# Patient Record
Sex: MALE | URBAN - METROPOLITAN AREA
[De-identification: names, ages, dates, MRNs, and addresses within clinical notes are randomized per-mention and may not be internally consistent; named-entity substitution may affect disease eponyms.]

---

## 2023-06-19 ENCOUNTER — OFFICE VISIT (OUTPATIENT)
Facility: LOCATION | Age: 48
End: 2023-06-19
Payer: COMMERCIAL

## 2023-06-19 DIAGNOSIS — H04.123 DRY EYE SYNDROME OF BILATERAL LACRIMAL GLANDS: ICD-10-CM

## 2023-06-19 DIAGNOSIS — H18.623 KERATOCONUS, UNSTABLE, BILATERAL: Primary | ICD-10-CM

## 2023-06-19 DIAGNOSIS — H16.223 KERATOCONJUNCT SICCA, NOT SPECIFIED AS SJOGREN'S, BILATERAL: ICD-10-CM

## 2023-06-19 DIAGNOSIS — H18.713 CORNEAL ECTASIA, BILATERAL: ICD-10-CM

## 2023-06-19 PROCEDURE — 99204 OFFICE O/P NEW MOD 45 MIN: CPT | Performed by: OPHTHALMOLOGY

## 2023-06-19 PROCEDURE — 92025 CPTRIZED CORNEAL TOPOGRAPHY: CPT | Performed by: OPHTHALMOLOGY

## 2023-06-19 PROCEDURE — 76514 ECHO EXAM OF EYE THICKNESS: CPT | Performed by: OPHTHALMOLOGY

## 2023-06-19 RX ORDER — ERYTHROMYCIN 5 MG/G
OINTMENT OPHTHALMIC
Qty: 3.5 | Refills: 11 | Status: ACTIVE
Start: 2023-06-19

## 2023-06-19 ASSESSMENT — VISUAL ACUITY
OD: 20/30+1
OS: 20/40

## 2023-06-19 NOTE — IMPRESSION/PLAN
Impression: Keratoconjunct sicca, not specified as Sjogren's, bilateral: P43.510. Plan: RTC in 2 weeks for Trini x4 and Myriamw with Dr. Saeid Amador.

## 2023-06-19 NOTE — IMPRESSION/PLAN
Impression: Dry eye syndrome of bilateral lacrimal glands: H04.123. Plan: Start Oasis Tears Plus q2h WA OU, erythromycin raul qhs OU, hot compresses BID OU, and omega 3s.

## 2023-06-19 NOTE — IMPRESSION/PLAN
Impression: Keratoconus, unstable, bilateral: B49.077. Plan: The patient has clinically significant, unstable keratoconus that is worsening and is at high risk of permanent central corneal scarring, requiring a corneal transplant in each eye, along with extensive and expensive lifelong care that goes along with a corneal transplant. Discussed with the patient that their best chance of maintaining useful vision is avoiding transplant by proceeding with corneal collage cross-linking ASAP. Emphasized the importance of adequate dry eye tx before CXL to avoid corneal haze and no vitamin C intake for at least 1 week before CXL. Also advised pt not to rub the eyes and use Zaditor PRN instead. Once dry eyes are adequately treated and no SPK noted (5-minute Fluorescein test), RTC for re-evaluation and final clearance with Dr. Brent Soria. Discussed alternative treatment options with the pt, including INTACS and CXL with Peschke or Avedro. Explained to the pt that there is ~ 3 percent chance that blood vessels may grow around INTACS. Pt elects to have CXL OD then 1-3 months later, CXL OS. Explained to patient likely need for Scleral lens after CXL. RTC in 1 week for CXL consult. RTC in 3-4 weeks for CXL pre-op and final CXL clearance with Dr. Brent Soria. No IOP check. No gtts. 5 min fl test at that visit.

## 2023-06-19 NOTE — IMPRESSION/PLAN
Impression: Corneal ectasia, bilateral: H18.713. History of Lasik back in 2000. Plan: Examination revealed cornea Ectasia after Lasik.

## 2023-07-11 ENCOUNTER — OFFICE VISIT (OUTPATIENT)
Facility: LOCATION | Age: 48
End: 2023-07-11
Payer: COMMERCIAL

## 2023-07-11 DIAGNOSIS — H18.713 CORNEAL ECTASIA, BILATERAL: ICD-10-CM

## 2023-07-11 DIAGNOSIS — H04.123 DRY EYE SYNDROME OF BILATERAL LACRIMAL GLANDS: Primary | ICD-10-CM

## 2023-07-11 DIAGNOSIS — H18.623 KERATOCONUS, UNSTABLE, BILATERAL: ICD-10-CM

## 2023-07-11 DIAGNOSIS — H16.223 KERATOCONJUNCT SICCA, NOT SPECIFIED AS SJOGREN'S, BILATERAL: ICD-10-CM

## 2023-07-11 RX ORDER — TETRAHYDROZOLINE HCL/ZINC SULF 0.05-0.25%
DROPS OPHTHALMIC (EYE)
Qty: 0 | Refills: 0 | Status: ACTIVE
Start: 2023-07-11

## 2023-07-11 RX ORDER — OMEGA-3/DHA/EPA/FISH OIL 1000 MG
CAPSULE ORAL
Qty: 0 | Refills: 0 | Status: ACTIVE
Start: 2023-07-11

## 2023-07-11 NOTE — IMPRESSION/PLAN
Impression: Corneal ectasia, bilateral: H18.713. History of Lasik back in 2000. Plan: Examination revealed cornea Ectasia after Lasik. quit; on Therapy

## 2023-07-11 NOTE — IMPRESSION/PLAN
Impression: Keratoconus, unstable, bilateral: E93.615. Plan: The patient has clinically significant, unstable keratoconus that is worsening and is at high risk of permanent central corneal scarring, requiring a corneal transplant in each eye, along with extensive and expensive lifelong care that goes along with a corneal transplant. Discussed with the patient that their best chance of maintaining useful vision is avoiding transplant by proceeding with corneal collage cross-linking ASAP. Emphasized the importance of adequate dry eye tx before CXL to avoid corneal haze and no vitamin C intake for at least 1 week before CXL. Also advised pt not to rub the eyes and use Zaditor PRN instead. Once dry eyes are adequately treated and no SPK noted (5-minute Fluorescein test), RTC for re-evaluation and final clearance with Dr. Darrell Chacon. Discussed alternative treatment options with the pt, including INTACS and CXL with Peschke or Avedro. Explained to the pt that there is ~ 3 percent chance that blood vessels may grow around INTACS. Pt elects to have CXL OD then 1-3 months later, CXL OS. Explained to patient likely need for Scleral lens after CXL. RTC in 1 week for CXL consult. RTC in 3-4 weeks for CXL pre-op and final CXL clearance with Dr. Darrell Chacon. No IOP check. No gtts. 5 min fl test at that visit.

## 2023-07-11 NOTE — IMPRESSION/PLAN
Impression: Dry eye syndrome of bilateral lacrimal glands: H04.123.  Plan: pt to cont [place] Tears Plus q2h WA OU, erythromycin raul qhs OU, hot compresses BID OU, and omega 3s.
david plugs x 4 placed in office w/out complications
pt to rtc 1 week as scheduled for re-eval with dr gutierrez if dryness resolved pt to proceed with avedro cxl
pt to rtc prn decrease va, increase pain, redness, f/f.

## 2023-07-11 NOTE — IMPRESSION/PLAN
Impression: Keratoconjunct sicca, not specified as Sjogren's, bilateral: R09.819.  Plan: refer to #1

## 2023-07-19 ENCOUNTER — OFFICE VISIT (OUTPATIENT)
Facility: LOCATION | Age: 48
End: 2023-07-19
Payer: COMMERCIAL

## 2023-07-19 DIAGNOSIS — H18.713 CORNEAL ECTASIA, BILATERAL: Primary | ICD-10-CM

## 2023-07-19 DIAGNOSIS — H04.123 DRY EYE SYNDROME OF BILATERAL LACRIMAL GLANDS: ICD-10-CM

## 2023-07-19 DIAGNOSIS — H16.223 KERATOCONJUNCT SICCA, NOT SPECIFIED AS SJOGREN'S, BILATERAL: ICD-10-CM

## 2023-07-19 DIAGNOSIS — H18.623 KERATOCONUS, UNSTABLE, BILATERAL: ICD-10-CM

## 2023-07-19 PROCEDURE — 99214 OFFICE O/P EST MOD 30 MIN: CPT | Performed by: OPHTHALMOLOGY

## 2023-07-19 RX ORDER — SODIUM CHLORIDE 20 MG/ML
2 % SOLUTION OPHTHALMIC
Qty: 15 | Refills: 0 | Status: ACTIVE
Start: 2023-07-19

## 2023-07-19 RX ORDER — BROMFENAC SODIUM 0.7 MG/ML
0.07 % SOLUTION/ DROPS OPHTHALMIC
Qty: 3 | Refills: 3 | Status: ACTIVE
Start: 2023-07-19

## 2023-07-19 RX ORDER — MOXIFLOXACIN 5 MG/ML
0.5 % SOLUTION/ DROPS OPHTHALMIC
Qty: 3 | Refills: 3 | Status: ACTIVE
Start: 2023-07-19

## 2023-07-19 RX ORDER — LOTEPREDNOL ETABONATE 3.8 MG/G
0.38 % GEL OPHTHALMIC
Qty: 5 | Refills: 5 | Status: ACTIVE
Start: 2023-07-19

## 2023-07-19 NOTE — IMPRESSION/PLAN
Impression: Corneal ectasia, bilateral: H18.713.

5 min FL test performed today which revealed no staining. Cleared from a dry eye standpoint. Plan: The patient has clinically significant, unstable keratoconus that is worsening and is at high risk of permanent central corneal scarring, requiring a corneal transplant in each eye, along with extensive and expensive lifelong care that goes along with a corneal transplant. Discussed with the patient that their best chance of maintaining useful vision is avoiding transplant by proceeding with corneal collage cross-linking ASAP. Emphasized the importance of adequate dry eye tx before CXL to avoid corneal haze and no vitamin C intake for at least 1 week before CXL. Also advised pt not to rub the eyes and use Zaditor PRN instead. Once dry eyes are adequately treated and no SPK noted (5-minute Fluorescein test), RTC for re-evaluation and final clearance with Dr. Monik Miner. Plan: OD on 8/3/23. OS 1-3 months later. Patient advised to stop all Vitamin C and all omega 3s 1 week prior until the day after. RTC prn if worsening of condition noted.

## 2023-07-19 NOTE — IMPRESSION/PLAN
Impression: Dry eye syndrome of bilateral lacrimal glands: H04.123. Plan: Continue Oasis Tears Plus OU TID-QID, Electric Warm Eye Mask OU BID, Omega-3s po, and Erythromycin raul OU Qhs. PT to stop all omega 3s 1 week prior to CXL.

## 2023-07-19 NOTE — IMPRESSION/PLAN
Impression: Keratoconjunct sicca, not specified as Sjogren's, bilateral: J09.128. Plan: Same as above.

## 2023-08-18 ENCOUNTER — OFFICE VISIT (OUTPATIENT)
Facility: LOCATION | Age: 48
End: 2023-08-18
Payer: COMMERCIAL

## 2023-08-18 DIAGNOSIS — H18.611 KERATOCONUS, STABLE, RIGHT EYE: Primary | ICD-10-CM

## 2023-08-18 DIAGNOSIS — H18.622 KERATOCONUS, UNSTABLE, LEFT EYE: ICD-10-CM

## 2023-08-18 PROCEDURE — 92012 INTRM OPH EXAM EST PATIENT: CPT | Performed by: OPTOMETRIST

## 2023-08-22 ENCOUNTER — OFFICE VISIT (OUTPATIENT)
Facility: LOCATION | Age: 48
End: 2023-08-22
Payer: COMMERCIAL

## 2023-08-22 DIAGNOSIS — H18.611 KERATOCONUS, STABLE, RIGHT EYE: Primary | ICD-10-CM

## 2023-08-22 PROCEDURE — 92012 INTRM OPH EXAM EST PATIENT: CPT | Performed by: OPTOMETRIST

## 2023-08-22 RX ORDER — TETRAHYDROZOLINE HCL/ZINC SULF 0.05-0.25%
DROPS OPHTHALMIC (EYE)
Qty: 0 | Refills: 0 | Status: ACTIVE
Start: 2023-08-22

## 2023-09-26 ENCOUNTER — OFFICE VISIT (OUTPATIENT)
Facility: LOCATION | Age: 48
End: 2023-09-26
Payer: COMMERCIAL

## 2023-09-26 DIAGNOSIS — H18.622 KERATOCONUS, UNSTABLE, LEFT EYE: Primary | ICD-10-CM

## 2023-09-26 DIAGNOSIS — H18.611 KERATOCONUS, STABLE, RIGHT EYE: ICD-10-CM

## 2023-09-26 PROCEDURE — 99213 OFFICE O/P EST LOW 20 MIN: CPT | Performed by: OPTOMETRIST

## 2023-09-26 RX ORDER — TETRAHYDROZOLINE HCL/ZINC SULF 0.05-0.25%
DROPS OPHTHALMIC (EYE)
Qty: 0 | Refills: 0 | Status: ACTIVE
Start: 2023-09-26

## 2023-09-26 ASSESSMENT — INTRAOCULAR PRESSURE: OD: 22

## 2023-09-26 ASSESSMENT — KERATOMETRY
OD: 45.88
OD: 45.88

## 2023-09-26 ASSESSMENT — VISUAL ACUITY: OD: 20/30

## 2023-12-05 ENCOUNTER — OFFICE VISIT (OUTPATIENT)
Facility: LOCATION | Age: 48
End: 2023-12-05
Payer: COMMERCIAL

## 2023-12-05 DIAGNOSIS — H18.622 KERATOCONUS, UNSTABLE, LEFT EYE: ICD-10-CM

## 2023-12-05 DIAGNOSIS — H18.611 KERATOCONUS, STABLE, RIGHT EYE: Primary | ICD-10-CM

## 2023-12-05 PROCEDURE — 99213 OFFICE O/P EST LOW 20 MIN: CPT | Performed by: OPTOMETRIST

## 2023-12-05 ASSESSMENT — KERATOMETRY
OS: 44.25
OD: 46.75
OD: 46.00

## 2023-12-05 ASSESSMENT — INTRAOCULAR PRESSURE
OS: 18
OD: 18

## 2023-12-05 ASSESSMENT — VISUAL ACUITY
OD: 20/30+1
OS: 20/25

## 2024-02-06 ENCOUNTER — OFFICE VISIT (OUTPATIENT)
Facility: LOCATION | Age: 49
End: 2024-02-06

## 2024-02-06 DIAGNOSIS — H18.712 CORNEAL ECTASIA, LEFT EYE: ICD-10-CM

## 2024-02-06 PROCEDURE — 99213 OFFICE O/P EST LOW 20 MIN: CPT | Performed by: OPTOMETRIST

## 2024-02-06 ASSESSMENT — VISUAL ACUITY
OS: 20/25
OD: 20/25

## 2024-02-06 ASSESSMENT — KERATOMETRY
OS: 44.13
OD: 47.00
OD: 45.25

## 2024-02-06 ASSESSMENT — INTRAOCULAR PRESSURE
OS: 19
OD: 19

## 2024-02-13 ENCOUNTER — OFFICE VISIT (OUTPATIENT)
Facility: LOCATION | Age: 49
End: 2024-02-13
Payer: COMMERCIAL

## 2024-02-13 PROCEDURE — 99213 OFFICE O/P EST LOW 20 MIN: CPT | Performed by: OPTOMETRIST

## 2024-02-13 RX ORDER — LOTEPREDNOL ETABONATE 3.8 MG/G
0.38 % GEL OPHTHALMIC
Qty: 5 | Refills: 4 | Status: ACTIVE
Start: 2024-02-13

## 2024-02-13 RX ORDER — MOXIFLOXACIN 5 MG/ML
0.5 % SOLUTION/ DROPS OPHTHALMIC
Qty: 3 | Refills: 4 | Status: ACTIVE
Start: 2024-02-13

## 2024-02-13 ASSESSMENT — VISUAL ACUITY: OS: 20/25

## 2024-02-13 ASSESSMENT — KERATOMETRY: OS: 44.13

## 2024-03-13 ENCOUNTER — Encounter (OUTPATIENT)
Facility: LOCATION | Age: 49
End: 2024-03-13
Payer: COMMERCIAL

## 2024-03-13 PROCEDURE — 0402T COLGN CRS-LINK CRN&PACHYMTRY: CPT | Performed by: OPHTHALMOLOGY

## 2024-03-14 ENCOUNTER — POST-OPERATIVE VISIT (OUTPATIENT)
Facility: LOCATION | Age: 49
End: 2024-03-14
Payer: COMMERCIAL

## 2024-03-14 DIAGNOSIS — Z48.810 ENCOUNTER FOR SURGICAL AFTERCARE FOLLOWING SURGERY ON A SENSE ORGAN: Primary | ICD-10-CM

## 2024-03-14 PROCEDURE — 99024 POSTOP FOLLOW-UP VISIT: CPT | Performed by: OPHTHALMOLOGY

## 2024-03-19 ENCOUNTER — OFFICE VISIT (OUTPATIENT)
Facility: LOCATION | Age: 49
End: 2024-03-19
Payer: COMMERCIAL

## 2024-03-19 DIAGNOSIS — H18.612 KERATOCONUS, STABLE, LEFT EYE: Primary | ICD-10-CM

## 2024-03-19 PROCEDURE — 99213 OFFICE O/P EST LOW 20 MIN: CPT | Performed by: OPTOMETRIST

## 2024-04-16 ENCOUNTER — OFFICE VISIT (OUTPATIENT)
Facility: LOCATION | Age: 49
End: 2024-04-16
Payer: COMMERCIAL

## 2024-04-16 DIAGNOSIS — H18.612 KERATOCONUS, STABLE, LEFT EYE: Primary | ICD-10-CM

## 2024-04-16 PROCEDURE — 99213 OFFICE O/P EST LOW 20 MIN: CPT | Performed by: OPTOMETRIST

## 2024-04-16 ASSESSMENT — INTRAOCULAR PRESSURE
OS: 18
OD: 15

## 2024-04-16 ASSESSMENT — VISUAL ACUITY
OS: 20/40
OD: 20/30

## 2024-04-16 ASSESSMENT — KERATOMETRY
OD: 47.25
OS: 43.75

## 2024-06-18 ENCOUNTER — OFFICE VISIT (OUTPATIENT)
Facility: LOCATION | Age: 49
End: 2024-06-18
Payer: COMMERCIAL

## 2024-06-18 DIAGNOSIS — H18.612 KERATOCONUS, STABLE, LEFT EYE: Primary | ICD-10-CM

## 2024-06-18 PROCEDURE — 92025 CPTRIZED CORNEAL TOPOGRAPHY: CPT | Performed by: OPTOMETRIST

## 2024-06-18 PROCEDURE — 99213 OFFICE O/P EST LOW 20 MIN: CPT | Performed by: OPTOMETRIST

## 2024-06-18 ASSESSMENT — INTRAOCULAR PRESSURE
OS: 18
OD: 16

## 2024-06-18 ASSESSMENT — VISUAL ACUITY
OD: 20/40
OS: 20/40

## 2025-03-19 ENCOUNTER — OFFICE VISIT (OUTPATIENT)
Facility: LOCATION | Age: 50
End: 2025-03-19
Payer: COMMERCIAL

## 2025-03-19 DIAGNOSIS — H04.123 DRY EYE SYNDROME OF BILATERAL LACRIMAL GLANDS: Primary | ICD-10-CM

## 2025-03-19 DIAGNOSIS — H18.612 KERATOCONUS, STABLE, LEFT EYE: ICD-10-CM

## 2025-03-19 PROCEDURE — 99213 OFFICE O/P EST LOW 20 MIN: CPT | Performed by: OPTOMETRIST

## 2025-03-19 ASSESSMENT — INTRAOCULAR PRESSURE
OS: 21
OD: 18

## 2025-06-17 ENCOUNTER — OFFICE VISIT (OUTPATIENT)
Facility: LOCATION | Age: 50
End: 2025-06-17
Payer: COMMERCIAL

## 2025-06-17 DIAGNOSIS — H04.123 DRY EYE SYNDROME OF BILATERAL LACRIMAL GLANDS: ICD-10-CM

## 2025-06-17 DIAGNOSIS — H18.613 KERATOCONUS, STABLE, BILATERAL: Primary | ICD-10-CM

## 2025-06-17 PROCEDURE — 76514 ECHO EXAM OF EYE THICKNESS: CPT | Performed by: OPTOMETRIST

## 2025-06-17 PROCEDURE — 99214 OFFICE O/P EST MOD 30 MIN: CPT | Performed by: OPTOMETRIST

## 2025-06-17 PROCEDURE — 92025 CPTRIZED CORNEAL TOPOGRAPHY: CPT | Performed by: OPTOMETRIST

## 2025-06-17 ASSESSMENT — INTRAOCULAR PRESSURE
OS: 15
OD: 15

## 2025-06-17 ASSESSMENT — VISUAL ACUITY
OS: 20/25
OD: 20/30